# Patient Record
Sex: MALE | Race: WHITE | Employment: PART TIME | URBAN - METROPOLITAN AREA
[De-identification: names, ages, dates, MRNs, and addresses within clinical notes are randomized per-mention and may not be internally consistent; named-entity substitution may affect disease eponyms.]

---

## 2017-07-11 DIAGNOSIS — N32.81 OVERACTIVE BLADDER: Primary | ICD-10-CM

## 2017-07-11 RX ORDER — TAMSULOSIN HYDROCHLORIDE 0.4 MG/1
0.4 CAPSULE ORAL DAILY
Qty: 30 CAPSULE | Refills: 0 | Status: SHIPPED | OUTPATIENT
Start: 2017-07-11 | End: 2019-11-18

## 2017-07-21 ENCOUNTER — OFFICE VISIT (OUTPATIENT)
Dept: UROLOGY | Facility: CLINIC | Age: 42
End: 2017-07-21
Payer: COMMERCIAL

## 2017-07-21 VITALS
BODY MASS INDEX: 39.9 KG/M2 | HEIGHT: 71 IN | HEART RATE: 70 BPM | SYSTOLIC BLOOD PRESSURE: 140 MMHG | WEIGHT: 285 LBS | DIASTOLIC BLOOD PRESSURE: 92 MMHG

## 2017-07-21 DIAGNOSIS — N41.9 PROSTATITIS: ICD-10-CM

## 2017-07-21 DIAGNOSIS — N32.81 OVERACTIVE BLADDER: Primary | ICD-10-CM

## 2017-07-21 LAB — RESIDUAL VOLUME (RV) (EXTERNAL): 45

## 2017-07-21 PROCEDURE — 99212 OFFICE O/P EST SF 10 MIN: CPT | Performed by: UROLOGY

## 2017-07-21 PROCEDURE — 51798 US URINE CAPACITY MEASURE: CPT | Performed by: UROLOGY

## 2017-07-21 RX ORDER — TAMSULOSIN HYDROCHLORIDE 0.4 MG/1
0.4 CAPSULE ORAL DAILY
Qty: 90 CAPSULE | Refills: 3 | Status: SHIPPED | OUTPATIENT
Start: 2017-07-21 | End: 2018-05-31

## 2017-07-21 RX ORDER — SOLIFENACIN SUCCINATE 10 MG/1
10 TABLET, FILM COATED ORAL DAILY
Qty: 90 TABLET | Refills: 3 | Status: SHIPPED | OUTPATIENT
Start: 2017-07-21 | End: 2018-05-31

## 2017-07-21 ASSESSMENT — PAIN SCALES - GENERAL: PAINLEVEL: NO PAIN (0)

## 2017-07-21 NOTE — NURSING NOTE
Chief Complaint   Patient presents with     Annual Prostate Exam     Patient is here for yearky exam.     Refill Request     Patient needs refill on vesicare and flomax     Marino Stone LPN 10:47 AM July 21, 2017

## 2017-07-21 NOTE — PROGRESS NOTES
Manny Olvera is a 41-year-old male with an overactive bladder. His symptoms are controlled with Vesicare 10 mg daily and tamsulosin 0.4 mg daily. He has had no dysuria or hematuria. He has no family history of prostate cancer  Urinalysis today appears normal, postvoid residual 45 cc.  Other past medical history: Hypertension, sleep apnea, former smoker  Medications: Vitamin D, multivitamin, Benicar, Zoloft, tamsulosin, Vesicare  Allergies: Atenolol, bisoprolol, clonidine, diltiazem, Lotrel, Terazosin  Exam: Normal appearance, normal vital signs, alert and oriented, normocephalic, normal respirations, neuro grossly intact. Normal sphincter tone, no rectal mass or impaction, small benign prostate, normal seminal vesicles  Assessment: Overactive bladder  Plan: Renew Vesicare, tamsulosin. See me yearly for urinalysis, postvoid residual, digital rectal exam. Baseline PSA at age 48

## 2017-07-21 NOTE — LETTER
7/21/2017       RE: Manny Olvera  [address] [facility] [address]     Dear Colleague,    Thank you for referring your patient, Manny Olvera, to the [facility] UROLOGY CLINIC [place] at [facility]. Please see a copy of my visit note below.    Manny Olvera is a 41-year-old male with an overactive bladder. His symptoms are controlled with Vesicare 10 mg daily and tamsulosin 0.4 mg daily. He has had no dysuria or hematuria. He has no family history of prostate cancer  Urinalysis today appears normal, postvoid residual 45 cc.  Other past medical history: Hypertension, sleep apnea, former smoker  Medications: Vitamin D, multivitamin, Benicar, Zoloft, tamsulosin, Vesicare  Allergies: Atenolol, bisoprolol, clonidine, diltiazem, Lotrel, Terazosin  Exam: Normal appearance, normal vital signs, alert and oriented, normocephalic, normal respirations, neuro grossly intact. Normal sphincter tone, no rectal mass or impaction, small benign prostate, normal seminal vesicles  Assessment: Overactive bladder  Plan: Renew Vesicare, tamsulosin. See me yearly for urinalysis, postvoid residual, digital rectal exam. Baseline PSA at age 48    Again, thank you for allowing me to participate in the care of your patient.      Sincerely,    Jorden Cm MD

## 2017-07-21 NOTE — MR AVS SNAPSHOT
"              After Visit Summary   7/21/2017    Manny Olvera    MRN: [id number]           Patient Information     Date Of Birth          1975        Visit Information        Provider Department      7/21/2017 10:30 AM Jorden Cm MD [facility] [place]        Today's Diagnoses     Overactive bladder    -  1       Follow-ups after your visit        Follow-up notes from your care team     Return in about 1 year (around 7/21/2018) for UA, PVR, NATACHA.      Who to contact     If you have questions or need follow up information about today's clinic visit or your schedule please contact [facility] UROLOGY [facility] directly at [phone].  Normal or non-critical lab and imaging results will be communicated to you by MyChart, letter or phone within 4 business days after the clinic has received the results. If you do not hear from us within 7 days, please contact the clinic through Crowdfunderhart or phone. If you have a critical or abnormal lab result, we will notify you by phone as soon as possible.  Submit refill requests through JumpSeller or call your pharmacy and they will forward the refill request to us. Please allow 3 business days for your refill to be completed.          Additional Information About Your Visit        MyChart Information     JumpSeller gives you secure access to your electronic health record. If you see a primary care provider, you can also send messages to your care team and make appointments. If you have questions, please call your primary care clinic.  If you do not have a primary care provider, please call [phone] and they will assist you.        Care EveryWhere ID     This is your Care EveryWhere ID. This could be used by other organizations to access your [place] medical records  [id number]        Your Vitals Were     Pulse Height BMI (Body Mass Index)             70 1.803 m (5' 11\") 39.75 kg/m2          Blood Pressure from " Last 3 Encounters:   07/21/17 (!) 140/92   11/05/14 138/89   08/05/14 129/88    Weight from Last 3 Encounters:   07/21/17 129.3 kg (285 lb)   11/05/14 128.8 kg (284 lb)   08/05/14 124.7 kg (275 lb)              Today, you had the following     No orders found for display         Today's Medication Changes          These changes are accurate as of: 7/21/17 11:17 AM.  If you have any questions, ask your nurse or doctor.               These medicines have changed or have updated prescriptions.        Dose/Directions    * tamsulosin 0.4 MG capsule   Commonly known as:  FLOMAX   This may have changed:  Another medication with the same name was added. Make sure you understand how and when to take each.   Used for:  Overactive bladder   Changed by:  Jorden Cm MD        Dose:  0.4 mg   Take 1 capsule (0.4 mg) by mouth daily   Quantity:  30 capsule   Refills:  0       * tamsulosin 0.4 MG capsule   Commonly known as:  FLOMAX   This may have changed:  You were already taking a medication with the same name, and this prescription was added. Make sure you understand how and when to take each.   Used for:  Overactive bladder   Changed by:  Jorden Cm MD        Dose:  0.4 mg   Take 1 capsule (0.4 mg) by mouth daily   Quantity:  90 capsule   Refills:  3       * VESICARE 10 MG tablet   This may have changed:  Another medication with the same name was added. Make sure you understand how and when to take each.   Used for:  Hypertrophy of prostate without urinary obstruction and other lower urinary tract symptoms (LUTS)   Generic drug:  solifenacin   Changed by:  Gustavo Torre MD        Take one tablet by mouth one time daily   Quantity:  90 tablet   Refills:  0       * solifenacin 10 MG tablet   Commonly known as:  VESICARE   This may have changed:  You were already taking a medication with the same name, and this prescription was added. Make sure you understand how and when to take each.   Used for:  Overactive  bladder   Changed by:  Jorden Cm MD        Dose:  10 mg   Take 1 tablet (10 mg) by mouth daily   Quantity:  90 tablet   Refills:  3       * Notice:  This list has 4 medication(s) that are the same as other medications prescribed for you. Read the directions carefully, and ask your doctor or other care provider to review them with you.         Where to get your medicines      These medications were sent to [place] [address] [place] [facility] [place] [address] [facility] [address]     Phone:  [phone]     solifenacin 10 MG tablet    tamsulosin 0.4 MG capsule                Primary Care Provider Office Phone # Fax #    Gustavo Torre [phone]       [address] [place] [address] [place] [address]        Equal Access to Services     [facility] STEPHANIE : Hadii fernie ordoñez hadasho Soomaali, waaxda luqadaha, qaybta kaalmada adeegyada, lj hoffman . So [facility] [phone].    ATENCIÓN: Si habla español, tiene a bob disposición servicios gratuitos de asistencia lingüística. Ghassan al [phone].    We comply with applicable federal civil rights laws and [place] laws. We do not discriminate on the basis of race, color, national origin, age, disability sex, sexual orientation or gender identity.            Thank you!     Thank you for choosing [facility] UROLOGY CLINIC [place]  for your care. Our goal is always to provide you with excellent care. Hearing back from our patients is one way we can continue to improve our services. Please take a few minutes to complete the written survey that you may receive in the mail after your visit with us. Thank you!             Your Updated Medication List - Protect others around you: Learn how to safely use, store and throw away your medicines at www.disposemymeds.org.          This list is accurate as of: 7/21/17 11:17 AM.  Always use your most recent med list.                   Brand  Name Dispense Instructions for use Diagnosis    cholecalciferol 1000 UNIT tablet    vitamin D    100 tablet    Take 1 tablet by mouth daily.        Multi-vitamin Tabs tablet     100 tablet    Take 1 tablet by mouth daily.        olmesartan-hydrochlorothiazide 40-25 MG per tablet    BENICAR HCT    90 tablet    Take 1 tablet by mouth daily    Essential hypertension, benign       sertraline 50 MG tablet    ZOLOFT    30 tablet    Take 1 tablet (50 mg) by mouth daily    Anxiety state, unspecified, Adjustment disorder with depressed mood       * tamsulosin 0.4 MG capsule    FLOMAX    30 capsule    Take 1 capsule (0.4 mg) by mouth daily    Overactive bladder       * tamsulosin 0.4 MG capsule    FLOMAX    90 capsule    Take 1 capsule (0.4 mg) by mouth daily    Overactive bladder       * VESICARE 10 MG tablet   Generic drug:  solifenacin     90 tablet    Take one tablet by mouth one time daily    Hypertrophy of prostate without urinary obstruction and other lower urinary tract symptoms (LUTS)       * solifenacin 10 MG tablet    VESICARE    90 tablet    Take 1 tablet (10 mg) by mouth daily    Overactive bladder       * Notice:  This list has 4 medication(s) that are the same as other medications prescribed for you. Read the directions carefully, and ask your doctor or other care provider to review them with you.

## 2018-05-31 DIAGNOSIS — N32.81 OVERACTIVE BLADDER: ICD-10-CM

## 2018-05-31 RX ORDER — TAMSULOSIN HYDROCHLORIDE 0.4 MG/1
CAPSULE ORAL
Qty: 90 CAPSULE | Refills: 2 | Status: SHIPPED | OUTPATIENT
Start: 2018-05-31

## 2018-05-31 RX ORDER — SOLIFENACIN SUCCINATE 10 MG/1
TABLET, FILM COATED ORAL
Qty: 90 TABLET | Refills: 0 | Status: SHIPPED | OUTPATIENT
Start: 2018-05-31

## 2018-09-26 ENCOUNTER — OFFICE VISIT (OUTPATIENT)
Dept: UROLOGY | Facility: CLINIC | Age: 43
End: 2018-09-26
Payer: COMMERCIAL

## 2018-09-26 VITALS
WEIGHT: 290 LBS | BODY MASS INDEX: 39.28 KG/M2 | DIASTOLIC BLOOD PRESSURE: 70 MMHG | HEIGHT: 72 IN | SYSTOLIC BLOOD PRESSURE: 140 MMHG | OXYGEN SATURATION: 98 % | HEART RATE: 106 BPM

## 2018-09-26 DIAGNOSIS — R33.8 BENIGN PROSTATIC HYPERPLASIA WITH URINARY RETENTION: Primary | ICD-10-CM

## 2018-09-26 DIAGNOSIS — N40.1 BENIGN PROSTATIC HYPERPLASIA WITH URINARY RETENTION: Primary | ICD-10-CM

## 2018-09-26 DIAGNOSIS — E66.01 MORBID OBESITY (H): ICD-10-CM

## 2018-09-26 DIAGNOSIS — N32.81 OVERACTIVE BLADDER: ICD-10-CM

## 2018-09-26 LAB
ALBUMIN UR-MCNC: NEGATIVE MG/DL
APPEARANCE UR: CLEAR
BILIRUB UR QL STRIP: NEGATIVE
COLOR UR AUTO: YELLOW
GLUCOSE UR STRIP-MCNC: NEGATIVE MG/DL
HGB UR QL STRIP: NEGATIVE
KETONES UR STRIP-MCNC: NEGATIVE MG/DL
LEUKOCYTE ESTERASE UR QL STRIP: NEGATIVE
NITRATE UR QL: NEGATIVE
PH UR STRIP: 6 PH (ref 5–7)
SOURCE: NORMAL
SP GR UR STRIP: 1.02 (ref 1–1.03)
UROBILINOGEN UR STRIP-ACNC: 0.2 EU/DL (ref 0.2–1)

## 2018-09-26 PROCEDURE — 99213 OFFICE O/P EST LOW 20 MIN: CPT | Performed by: UROLOGY

## 2018-09-26 PROCEDURE — 81003 URINALYSIS AUTO W/O SCOPE: CPT | Performed by: UROLOGY

## 2018-09-26 RX ORDER — SOLIFENACIN SUCCINATE 10 MG/1
10 TABLET, FILM COATED ORAL DAILY
Qty: 90 TABLET | Refills: 3 | Status: SHIPPED | OUTPATIENT
Start: 2018-09-26

## 2018-09-26 RX ORDER — TAMSULOSIN HYDROCHLORIDE 0.4 MG/1
0.4 CAPSULE ORAL DAILY
Qty: 90 CAPSULE | Refills: 3 | Status: SHIPPED | OUTPATIENT
Start: 2018-09-26 | End: 2019-10-11

## 2018-09-26 ASSESSMENT — PAIN SCALES - GENERAL: PAINLEVEL: NO PAIN (0)

## 2018-09-26 NOTE — LETTER
9/26/2018       RE: Manny Olvera  [address] [facility] [address]     Dear Colleague,    Thank you for referring your patient, Manny Olvera, to the [facility] UROLOGY CLINIC [place] at [facility]. Please see a copy of my visit note below.    Manny Olvera is a 43-year-old male with an overactive bladder muscle. He's doing well on Flomax and Vesicare. His postvoid residual is only 33 cc and he has a normal urinalysis. He's had no dysuria or hematuria  Other past medical history: Essential hypertension, sleep apnea, history of prostatitis, former smoker, obesity  Family history: Hypertension. No family history of prostate cancer  Medications: Vitamin D, multivitamin, Benicar, Zoloft, Flomax, Vesicare 10 mg daily  Allergies: Atenolol, bisoprolol, clonidine, diltiazem, Lotrel, Terazosin  Exam: Alert and oriented, normal appearance, normal vital signs. Normocephalic, normal respirations, neuro grossly intact. Normal sphincter tone, no rectal mass or impaction, benign feeling prostate, normal seminal vesicles  Assessment: Overactive bladder  Plan: Renew Vesicare, tamsulosin. See me yearly for urinalysis, PVR, NATACHA. Baseline PSA at age 48    Again, thank you for allowing me to participate in the care of your patient.      Sincerely,    Jorden Cm MD

## 2018-09-26 NOTE — PROGRESS NOTES
Manny Olvera is a 43-year-old male with an overactive bladder muscle. He's doing well on Flomax and Vesicare. His postvoid residual is only 33 cc and he has a normal urinalysis. He's had no dysuria or hematuria  Other past medical history: Essential hypertension, sleep apnea, history of prostatitis, former smoker, obesity  Family history: Hypertension. No family history of prostate cancer  Medications: Vitamin D, multivitamin, Benicar, Zoloft, Flomax, Vesicare 10 mg daily  Allergies: Atenolol, bisoprolol, clonidine, diltiazem, Lotrel, Terazosin  Exam: Alert and oriented, normal appearance, normal vital signs. Normocephalic, normal respirations, neuro grossly intact. Normal sphincter tone, no rectal mass or impaction, benign feeling prostate, normal seminal vesicles  Assessment: Overactive bladder  Plan: Renew Vesicare, tamsulosin. See me yearly for urinalysis, PVR, NATACHA. Baseline PSA at age 48

## 2018-09-26 NOTE — MR AVS SNAPSHOT
After Visit Summary   9/26/2018    Manny Olvera    MRN: [id number]           Patient Information     Date Of Birth          1975        Visit Information        Provider Department      9/26/2018 3:30 PM Jorden Cm MD [facility]rology Clinic [place]        Today's Diagnoses     Benign prostatic hyperplasia with urinary retention    -  1    Morbid obesity (H)        Overactive bladder           Follow-ups after your visit        Follow-up notes from your care team     Return in about 1 year (around 9/26/2019) for Physical Exam.      Who to contact     If you have questions or need follow up information about today's clinic visit or your schedule please contact [facility]OLOG[facility] CLINIC [place] directly at [phone].  Normal or non-critical lab and imaging results will be communicated to you by PiCloudhart, letter or phone within 4 business days after the clinic has received the results. If you do not hear from us within 7 days, please contact the clinic through PiCloudhart or phone. If you have a critical or abnormal lab result, we will notify you by phone as soon as possible.  Submit refill requests through SocialCom or call your pharmacy and they will forward the refill request to us. Please allow 3 business days for your refill to be completed.          Additional Information About Your Visit        MyChart Information     SocialCom gives you secure access to your electronic health record. If you see a primary care provider, you can also send messages to your care team and make appointments. If you have questions, please call your primary care clinic.  If you do not have a primary care provider, please call [phone] and they will assist you.        Care EveryWhere ID     This is your Care EveryWhere ID. This could be used by other organizations to access your [place] medical records  [id number]        Your Vitals Were     Pulse Height Pulse  Oximetry BMI (Body Mass Index)          106 1.829 m (6') 98% 39.33 kg/m2         Blood Pressure from Last 3 Encounters:   09/26/18 140/70   07/21/17 (!) 140/92   11/05/14 138/89    Weight from Last 3 Encounters:   09/26/18 131.5 kg (290 lb)   07/21/17 129.3 kg (285 lb)   11/05/14 128.8 kg (284 lb)              We Performed the Following     UA without Microscopic          Today's Medication Changes          These changes are accurate as of 9/26/18  3:59 PM.  If you have any questions, ask your nurse or doctor.               These medicines have changed or have updated prescriptions.        Dose/Directions    * tamsulosin 0.4 MG capsule   Commonly known as:  FLOMAX   This may have changed:  Another medication with the same name was added. Make sure you understand how and when to take each.   Used for:  Overactive bladder   Changed by:  Jorden Cm MD        Dose:  0.4 mg   Take 1 capsule (0.4 mg) by mouth daily   Quantity:  30 capsule   Refills:  0       * tamsulosin 0.4 MG capsule   Commonly known as:  FLOMAX   This may have changed:  Another medication with the same name was added. Make sure you understand how and when to take each.   Used for:  Overactive bladder   Changed by:  Jorden Cm MD        TAKE ONE CAPSULE BY MOUTH DAILY   Quantity:  90 capsule   Refills:  2       * tamsulosin 0.4 MG capsule   Commonly known as:  FLOMAX   This may have changed:  You were already taking a medication with the same name, and this prescription was added. Make sure you understand how and when to take each.   Used for:  Benign prostatic hyperplasia with urinary retention   Changed by:  Jorden Cm MD        Dose:  0.4 mg   Take 1 capsule (0.4 mg) by mouth daily 30 minutes after meal   Quantity:  90 capsule   Refills:  3       * VESICARE 10 MG tablet   This may have changed:  Another medication with the same name was added. Make sure you understand how and when to take each.   Used for:  Hypertrophy of  prostate without urinary obstruction and other lower urinary tract symptoms (LUTS)   Generic drug:  solifenacin   Changed by:  Jorden Cm MD        Take one tablet by mouth one time daily   Quantity:  90 tablet   Refills:  0       * VESICARE 10 MG tablet   This may have changed:  Another medication with the same name was added. Make sure you understand how and when to take each.   Used for:  Overactive bladder   Generic drug:  solifenacin   Changed by:  Jorden Cm MD        TAKE 1 TABLET BY MOUTH DAILY   Quantity:  90 tablet   Refills:  0       * solifenacin 10 MG tablet   Commonly known as:  VESICARE   This may have changed:  You were already taking a medication with the same name, and this prescription was added. Make sure you understand how and when to take each.   Used for:  Overactive bladder   Changed by:  Jorden Cm MD        Dose:  10 mg   Take 1 tablet (10 mg) by mouth daily   Quantity:  90 tablet   Refills:  3       * Notice:  This list has 6 medication(s) that are the same as other medications prescribed for you. Read the directions carefully, and ask your doctor or other care provider to review them with you.         Where to get your medicines      These medications were sent to [place] [address] [place] [address] [address]     Phone:  [phone]     solifenacin 10 MG tablet    tamsulosin 0.4 MG capsule                Primary Care Provider Office Phone # Fax #    Gustavo Torre [phone]       [address] [place] [address] [place] [address]        Equal Access to Services     [facility] AH: Hadii aad ku hadasho Soomaali, waaxda luqadaha, qaybta kaalmada adeegyada, waxay brittany torres. So [facility] [phone].    ATENCIÓN: Si habla español, tiene a bob disposición servicios gratuitos de asistencia lingüística. Llame al [phone].    We comply with applicable [facility] civil  rights laws and [place] laws. We do not discriminate on the basis of race, color, national origin, age, disability, sex, sexual orientation, or gender identity.            Thank you!     Thank you for choosing [facility] UROLOGY CLINIC [place]  for your care. Our goal is always to provide you with excellent care. Hearing back from our patients is one way we can continue to improve our services. Please take a few minutes to complete the written survey that you may receive in the mail after your visit with us. Thank you!             Your Updated Medication List - Protect others around you: Learn how to safely use, store and throw away your medicines at www.disposemymeds.org.          This list is accurate as of 9/26/18  3:59 PM.  Always use your most recent med list.                   Brand Name Dispense Instructions for use Diagnosis    cholecalciferol 1000 UNIT tablet    vitamin D3    100 tablet    Take 1 tablet by mouth daily.        Multi-vitamin Tabs tablet     100 tablet    Take 1 tablet by mouth daily.        olmesartan-hydrochlorothiazide 40-25 MG per tablet    BENICAR HCT    90 tablet    Take 1 tablet by mouth daily    Essential hypertension, benign       sertraline 50 MG tablet    ZOLOFT    30 tablet    Take 1 tablet (50 mg) by mouth daily    Anxiety state, unspecified, Adjustment disorder with depressed mood       * tamsulosin 0.4 MG capsule    FLOMAX    30 capsule    Take 1 capsule (0.4 mg) by mouth daily    Overactive bladder       * tamsulosin 0.4 MG capsule    FLOMAX    90 capsule    TAKE ONE CAPSULE BY MOUTH DAILY    Overactive bladder       * tamsulosin 0.4 MG capsule    FLOMAX    90 capsule    Take 1 capsule (0.4 mg) by mouth daily 30 minutes after meal    Benign prostatic hyperplasia with urinary retention       * VESICARE 10 MG tablet   Generic drug:  solifenacin     90 tablet    Take one tablet by mouth one time daily    Hypertrophy of prostate without urinary obstruction and  other lower urinary tract symptoms (LUTS)       * VESICARE 10 MG tablet   Generic drug:  solifenacin     90 tablet    TAKE 1 TABLET BY MOUTH DAILY    Overactive bladder       * solifenacin 10 MG tablet    VESICARE    90 tablet    Take 1 tablet (10 mg) by mouth daily    Overactive bladder       * Notice:  This list has 6 medication(s) that are the same as other medications prescribed for you. Read the directions carefully, and ask your doctor or other care provider to review them with you.

## 2019-10-11 DIAGNOSIS — R33.8 BENIGN PROSTATIC HYPERPLASIA WITH URINARY RETENTION: ICD-10-CM

## 2019-10-11 DIAGNOSIS — N40.1 BENIGN PROSTATIC HYPERPLASIA WITH URINARY RETENTION: ICD-10-CM

## 2019-10-11 RX ORDER — SOLIFENACIN SUCCINATE 5 MG/1
TABLET, FILM COATED ORAL
Qty: 60 TABLET | Refills: 2 | Status: SHIPPED | OUTPATIENT
Start: 2019-10-11 | End: 2019-12-07

## 2019-10-11 RX ORDER — TAMSULOSIN HYDROCHLORIDE 0.4 MG/1
CAPSULE ORAL
Qty: 30 CAPSULE | Refills: 0 | Status: SHIPPED | OUTPATIENT
Start: 2019-10-11

## 2019-11-07 DIAGNOSIS — R33.8 BENIGN PROSTATIC HYPERPLASIA WITH URINARY RETENTION: ICD-10-CM

## 2019-11-07 DIAGNOSIS — N40.1 BENIGN PROSTATIC HYPERPLASIA WITH URINARY RETENTION: ICD-10-CM

## 2019-11-07 RX ORDER — TAMSULOSIN HYDROCHLORIDE 0.4 MG/1
CAPSULE ORAL
Qty: 30 CAPSULE | Refills: 0 | OUTPATIENT
Start: 2019-11-07

## 2019-11-11 DIAGNOSIS — N40.1 BENIGN PROSTATIC HYPERPLASIA WITH URINARY RETENTION: ICD-10-CM

## 2019-11-11 DIAGNOSIS — R33.8 BENIGN PROSTATIC HYPERPLASIA WITH URINARY RETENTION: ICD-10-CM

## 2019-11-11 RX ORDER — SOLIFENACIN SUCCINATE 5 MG/1
TABLET, FILM COATED ORAL
Qty: 60 TABLET | Refills: 0 | OUTPATIENT
Start: 2019-11-11

## 2019-11-18 DIAGNOSIS — N32.81 OVERACTIVE BLADDER: ICD-10-CM

## 2019-11-18 RX ORDER — TAMSULOSIN HYDROCHLORIDE 0.4 MG/1
0.4 CAPSULE ORAL DAILY
Qty: 90 CAPSULE | Refills: 0 | Status: SHIPPED | OUTPATIENT
Start: 2019-11-18

## 2019-11-18 NOTE — TELEPHONE ENCOUNTER
[facility] Call Center    Phone Message    May a detailed message be left on voicemail: yes    Reason for Call: Medication Refill Request    Has the patient contacted the pharmacy for the refill? Yes   Name of medication being requested: tamsulosin (FLOMAX) 0.4 MG capsule  Provider who prescribed the medication: Dr. Jorden Cm  Pharmacy: [facility] [id number] [address]  Date medication is needed: asap         Action Taken: Other: ua uro

## 2019-12-07 DIAGNOSIS — N40.1 BENIGN PROSTATIC HYPERPLASIA WITH URINARY RETENTION: ICD-10-CM

## 2019-12-07 DIAGNOSIS — R33.8 BENIGN PROSTATIC HYPERPLASIA WITH URINARY RETENTION: ICD-10-CM

## 2019-12-09 ENCOUNTER — HEALTH MAINTENANCE LETTER (OUTPATIENT)
Age: 44
End: 2019-12-09

## 2019-12-09 RX ORDER — SOLIFENACIN SUCCINATE 5 MG/1
TABLET, FILM COATED ORAL
Qty: 60 TABLET | Refills: 1 | Status: SHIPPED | OUTPATIENT
Start: 2019-12-09 | End: 2020-02-07

## 2020-01-02 DIAGNOSIS — N32.81 OVERACTIVE BLADDER: Primary | ICD-10-CM

## 2020-01-08 ENCOUNTER — OFFICE VISIT (OUTPATIENT)
Dept: UROLOGY | Facility: CLINIC | Age: 45
End: 2020-01-08
Payer: COMMERCIAL

## 2020-01-08 VITALS
OXYGEN SATURATION: 98 % | HEART RATE: 87 BPM | HEIGHT: 72 IN | DIASTOLIC BLOOD PRESSURE: 72 MMHG | BODY MASS INDEX: 34.54 KG/M2 | WEIGHT: 255 LBS | SYSTOLIC BLOOD PRESSURE: 140 MMHG

## 2020-01-08 DIAGNOSIS — R35.0 BENIGN PROSTATIC HYPERPLASIA WITH URINARY FREQUENCY: ICD-10-CM

## 2020-01-08 DIAGNOSIS — N32.81 OVERACTIVE BLADDER: Primary | ICD-10-CM

## 2020-01-08 DIAGNOSIS — N40.1 BENIGN PROSTATIC HYPERPLASIA WITH URINARY FREQUENCY: ICD-10-CM

## 2020-01-08 LAB
ALBUMIN UR-MCNC: NEGATIVE MG/DL
APPEARANCE UR: CLEAR
BILIRUB UR QL STRIP: NEGATIVE
COLOR UR AUTO: YELLOW
GLUCOSE UR STRIP-MCNC: NEGATIVE MG/DL
HGB UR QL STRIP: NEGATIVE
KETONES UR STRIP-MCNC: 40 MG/DL
LEUKOCYTE ESTERASE UR QL STRIP: NEGATIVE
NITRATE UR QL: NEGATIVE
PH UR STRIP: 6 PH (ref 5–7)
RESIDUAL VOLUME (RV) (EXTERNAL): 9
SOURCE: ABNORMAL
SP GR UR STRIP: 1.02 (ref 1–1.03)
UROBILINOGEN UR STRIP-ACNC: 0.2 EU/DL (ref 0.2–1)

## 2020-01-08 PROCEDURE — 99213 OFFICE O/P EST LOW 20 MIN: CPT | Mod: 25 | Performed by: UROLOGY

## 2020-01-08 PROCEDURE — 51798 US URINE CAPACITY MEASURE: CPT | Performed by: UROLOGY

## 2020-01-08 PROCEDURE — 81003 URINALYSIS AUTO W/O SCOPE: CPT | Performed by: UROLOGY

## 2020-01-08 RX ORDER — TAMSULOSIN HYDROCHLORIDE 0.4 MG/1
0.4 CAPSULE ORAL DAILY
Qty: 90 CAPSULE | Refills: 3 | Status: SHIPPED | OUTPATIENT
Start: 2020-01-08

## 2020-01-08 RX ORDER — SOLIFENACIN SUCCINATE 10 MG/1
10 TABLET, FILM COATED ORAL DAILY
Qty: 90 TABLET | Refills: 3 | Status: SHIPPED | OUTPATIENT
Start: 2020-01-08

## 2020-01-08 ASSESSMENT — MIFFLIN-ST. JEOR: SCORE: 2074.67

## 2020-01-08 ASSESSMENT — PAIN SCALES - GENERAL: PAINLEVEL: NO PAIN (0)

## 2020-01-08 NOTE — LETTER
1/8/2020       RE: Manny Olvera  [address] [facility] [address]     Dear Colleague,    Thank you for referring your patient, Manny Olvera, to the [facility] UROLOGY CLINIC [place] at [facility]. Please see a copy of my visit note below.    Manny is a 44-year-old male with BPH and overactive bladder.  He is doing well on tamsulosin and Vesicare 10 mg daily.  He has a normal urinalysis today, a 9 cc postvoid residual and no complaints.  There is no family history of prostate cancer.  Other past medical history: Hypertension, overweight, sleep apnea, prostatitis, former smoker  Medications: Vesicare, tamsulosin, Zoloft, Benicar, multivitamin, vitamin D  Allergies: Atenolol, bisoprolol, clonidine, diltiazem, Lotrel, terazosin  Review of systems: No dysuria or hematuria  Exam: Alert and oriented, normal appearance, normal vital signs.  Normal respirations, neuro grossly intact.  Normal sphincter tone, no rectal mass or impaction, benign and symmetric prostate about 30 cc, normal seminal vesicles.  Assessment: BPH, overactive bladder  Plan: Refill tamsulosin, Vesicare.  See me yearly for urinalysis, PVR, NATACHA.  PSA at age 48      Again, thank you for allowing me to participate in the care of your patient.      Sincerely,    Jorden Cm MD

## 2020-01-08 NOTE — PROGRESS NOTES
Manny is a 44-year-old male with BPH and overactive bladder.  He is doing well on tamsulosin and Vesicare 10 mg daily.  He has a normal urinalysis today, a 9 cc postvoid residual and no complaints.  There is no family history of prostate cancer.  Other past medical history: Hypertension, overweight, sleep apnea, prostatitis, former smoker  Medications: Vesicare, tamsulosin, Zoloft, Benicar, multivitamin, vitamin D  Allergies: Atenolol, bisoprolol, clonidine, diltiazem, Lotrel, terazosin  Review of systems: No dysuria or hematuria  Exam: Alert and oriented, normal appearance, normal vital signs.  Normal respirations, neuro grossly intact.  Normal sphincter tone, no rectal mass or impaction, benign and symmetric prostate about 30 cc, normal seminal vesicles.  Assessment: BPH, overactive bladder  Plan: Refill tamsulosin, Vesicare.  See me yearly for urinalysis, PVR, NATACHA.  PSA at age 48

## 2020-01-08 NOTE — NURSING NOTE
Chief Complaint   Patient presents with     Hx of Overactive bladder     Patient here today for 1 year UA and PVR and recheck on medication Vesicare, tamsulosin       Blood pressure (!) 140/72, pulse 87, height 1.829 m (6'), weight 115.7 kg (255 lb), SpO2 98 %. Body mass index is 34.58 kg/m .    Patient Active Problem List   Diagnosis     HYPERTENSION: Goal <115/75     Chronic prostatitis     Obesity     Lumbago     Nonallopathic lesion of lumbar region     Nonallopathic lesion of thoracic region     CARDIOVASCULAR SCREENING; LDL GOAL LESS THAN 160     NIKIA (obstructive sleep apnea)     Adjustment disorder with depressed mood     Anxiety state     Obesity (BMI 35.0-39.9) with comorbidity (H)       Allergies   Allergen Reactions     Atenolol      stomach ache     Bisoprolol Fumarate      throat Sx (globus)     Clonidine Hcl      throat Sx (globus)     Diltiazem Hcl      stomach upset     Lotrel      stomach ache     Terazosin Hcl      chest tightness, tachycardia       Current Outpatient Medications   Medication Sig Dispense Refill     cholecalciferol (VITAMIN D) 1000 UNIT tablet Take 1 tablet by mouth daily. 100 tablet 3     multivitamin, therapeutic with minerals (MULTI-VITAMIN) TABS Take 1 tablet by mouth daily. 100 tablet 3     olmesartan-hydrochlorothiazide (BENICAR HCT) 40-25 MG per tablet Take 1 tablet by mouth daily 90 tablet 1     solifenacin (VESICARE) 10 MG tablet Take 1 tablet (10 mg) by mouth daily 90 tablet 3     solifenacin (VESICARE) 10 MG tablet Take 1 tablet (10 mg) by mouth daily 90 tablet 3     solifenacin (VESICARE) 5 MG tablet TAKE 2 TABLETS BY MOUTH DAILY. 60 tablet 1     tamsulosin (FLOMAX) 0.4 MG capsule Take 1 capsule (0.4 mg) by mouth daily 90 capsule 3     tamsulosin (FLOMAX) 0.4 MG capsule Take 1 capsule (0.4 mg) by mouth daily 90 capsule 0     tamsulosin (FLOMAX) 0.4 MG capsule 30 minutes after meal.    Needs follow-up in clinic for refills. 30 capsule 0     tamsulosin (FLOMAX) 0.4 MG  capsule TAKE ONE CAPSULE BY MOUTH DAILY 90 capsule 2     VESICARE 10 MG tablet TAKE 1 TABLET BY MOUTH DAILY 90 tablet 0     VESICARE 10 MG tablet Take one tablet by mouth one time daily 90 tablet 0     sertraline (ZOLOFT) 50 MG tablet Take 1 tablet (50 mg) by mouth daily (Patient not taking: Reported on 2020) 30 tablet 0       Social History     Tobacco Use     Smoking status: Former Smoker     Packs/day: 1.00     Types: Cigarettes     Last attempt to quit: 11/3/1999     Years since quittin.1     Smokeless tobacco: Never Used   Substance Use Topics     Alcohol use: No     Drug use: No       UA RESULTS:  Recent Labs   Lab Test 20  1258   COLOR Yellow   APPEARANCE Clear   URINEGLC Negative   URINEBILI Negative   URINEKETONE 40*   SG 1.025   UBLD Negative   URINEPH 6.0   PROTEIN Negative   UROBILINOGEN 0.2   NITRITE Negative   LEUKEST Negative     PVR today was 9ML  Patient state is doing well  Patient just want refill on medication    DONAVAN Bustos  2020  1:04 PM

## 2020-02-07 DIAGNOSIS — R33.8 BENIGN PROSTATIC HYPERPLASIA WITH URINARY RETENTION: ICD-10-CM

## 2020-02-07 DIAGNOSIS — N40.1 BENIGN PROSTATIC HYPERPLASIA WITH URINARY RETENTION: ICD-10-CM

## 2020-02-07 RX ORDER — SOLIFENACIN SUCCINATE 5 MG/1
TABLET, FILM COATED ORAL
Qty: 60 TABLET | Refills: 11 | Status: SHIPPED | OUTPATIENT
Start: 2020-02-07 | End: 2020-12-16

## 2020-12-16 DIAGNOSIS — N40.1 BENIGN PROSTATIC HYPERPLASIA WITH URINARY RETENTION: ICD-10-CM

## 2020-12-16 DIAGNOSIS — R33.8 BENIGN PROSTATIC HYPERPLASIA WITH URINARY RETENTION: ICD-10-CM

## 2020-12-16 RX ORDER — SOLIFENACIN SUCCINATE 5 MG/1
TABLET, FILM COATED ORAL
Qty: 180 TABLET | Refills: 3 | Status: SHIPPED | OUTPATIENT
Start: 2020-12-16

## 2020-12-18 ENCOUNTER — TELEPHONE (OUTPATIENT)
Dept: UROLOGY | Facility: CLINIC | Age: 45
End: 2020-12-18

## 2020-12-18 NOTE — TELEPHONE ENCOUNTER
[facility] Call Center    Phone Message    May a detailed message be left on voicemail: yes     Reason for Call: Other: Pt requesting a call back to discuss antibiotics for a possible infection. He stated Dr. Cm has always mentioned having an antibiotic rx on standby in case pt has an infection flare up and he feels like he is having one now. He is wondering if the Rx can now be sent to his Pharmacy ([facility] in [place]). Please call back pt to discuss or inform when it has been sent. Thank you     Action Taken: Message routed to:  Other:  clinical pool    Travel Screening: Not Applicable

## 2020-12-21 NOTE — TELEPHONE ENCOUNTER
[facility] Call Center    Phone Message    May a detailed message be left on voicemail: yes     Reason for Call: Other: Pt following up on call from 12/18 about getting antibiotics for a possible infection. Please call back pt to disuc. Thank you     Action Taken: Message routed to:  Other:  clinical pool    Travel Screening: Not Applicable

## 2020-12-28 NOTE — TELEPHONE ENCOUNTER
"Per MD-   MD-\"Needs bacterial semen culture before antibiotics.\"    Called and spoke with patient who was very upset about this response. He was irritated that he hasn't heard anything for three weeks. Informed him that the first time he called was on the 12/18. Which was not three weeks ago and that I had returned his phone call last week. Patient was defiant and continued to be upset, saying that he didn't talk to anyone. I explained that I was instructing him with what the MD had told me today. He then harassed me saying \"Have you ever had this, do you know what it feels like\" To which I replied that patient should go to ER if symptoms are that severe. I explained that I answered his message to the best of my ability and patient was informed he could go to a [facility] lab or come to our office to leave a specimen, but he would need to make a lab appointment. Patient continued to be upset that he would have to wait for culture to grow and antibiotics to kick-in before it would be effective in managing symptoms. He ended call saying that he would go to ER or another care provider.     Eliza Torre LPN      "

## 2020-12-28 NOTE — TELEPHONE ENCOUNTER
"Spoke with patient 12/21 and sent message to MD. \"Patient thinks he has prostatitis symptoms again. He would like antibiotics before the holiday. He has an appointment to see you, but not until Jan. 23rd.\"     Eliza Torre LPN    "

## 2021-01-15 ENCOUNTER — HEALTH MAINTENANCE LETTER (OUTPATIENT)
Age: 46
End: 2021-01-15

## 2021-10-24 ENCOUNTER — HEALTH MAINTENANCE LETTER (OUTPATIENT)
Age: 46
End: 2021-10-24

## 2022-02-13 ENCOUNTER — HEALTH MAINTENANCE LETTER (OUTPATIENT)
Age: 47
End: 2022-02-13

## 2022-10-15 ENCOUNTER — HEALTH MAINTENANCE LETTER (OUTPATIENT)
Age: 47
End: 2022-10-15

## 2023-03-26 ENCOUNTER — HEALTH MAINTENANCE LETTER (OUTPATIENT)
Age: 48
End: 2023-03-26